# Patient Record
Sex: MALE | Race: WHITE | Employment: OTHER | URBAN - METROPOLITAN AREA
[De-identification: names, ages, dates, MRNs, and addresses within clinical notes are randomized per-mention and may not be internally consistent; named-entity substitution may affect disease eponyms.]

---

## 2018-11-15 PROBLEM — J30.9 ALLERGIC RHINITIS, UNSPECIFIED SEASONALITY, UNSPECIFIED TRIGGER: Status: ACTIVE | Noted: 2018-11-15

## 2018-11-15 PROBLEM — E55.9 VITAMIN D DEFICIENCY: Status: ACTIVE | Noted: 2018-11-15

## 2018-11-15 PROBLEM — R35.0 URINARY FREQUENCY: Status: ACTIVE | Noted: 2018-11-15

## 2018-11-15 PROBLEM — M72.2 PLANTAR FASCIITIS, BILATERAL: Status: ACTIVE | Noted: 2018-11-15

## 2018-11-15 PROBLEM — F41.1 GAD (GENERALIZED ANXIETY DISORDER): Status: ACTIVE | Noted: 2018-11-15

## 2018-11-15 PROBLEM — Z86.19 HISTORY OF SHINGLES: Status: ACTIVE | Noted: 2018-11-15

## 2018-11-15 PROBLEM — M85.80 OSTEOPENIA, UNSPECIFIED LOCATION: Status: ACTIVE | Noted: 2018-11-15

## 2019-01-07 PROCEDURE — 81015 MICROSCOPIC EXAM OF URINE: CPT | Performed by: UROLOGY

## 2019-01-21 RX ORDER — MAGNESIUM HYDROXIDE/ALUMINUM HYDROXICE/SIMETHICONE 120; 1200; 1200 MG/30ML; MG/30ML; MG/30ML
SUSPENSION ORAL 4 TIMES DAILY PRN
COMMUNITY
End: 2019-02-20

## 2019-01-24 ENCOUNTER — ANESTHESIA EVENT (OUTPATIENT)
Dept: ENDOSCOPY | Facility: HOSPITAL | Age: 59
End: 2019-01-24
Payer: COMMERCIAL

## 2019-01-24 ENCOUNTER — ANESTHESIA (OUTPATIENT)
Dept: ENDOSCOPY | Facility: HOSPITAL | Age: 59
End: 2019-01-24
Payer: COMMERCIAL

## 2019-01-24 ENCOUNTER — HOSPITAL ENCOUNTER (OUTPATIENT)
Facility: HOSPITAL | Age: 59
Setting detail: HOSPITAL OUTPATIENT SURGERY
Discharge: HOME | End: 2019-01-24
Attending: INTERNAL MEDICINE | Admitting: INTERNAL MEDICINE
Payer: COMMERCIAL

## 2019-01-24 DIAGNOSIS — K44.9 HIATAL HERNIA: Primary | ICD-10-CM

## 2019-01-24 DIAGNOSIS — R12 HEARTBURN: ICD-10-CM

## 2019-01-24 PROCEDURE — 0DB18ZX EXCISION OF UPPER ESOPHAGUS, VIA NATURAL OR ARTIFICIAL OPENING ENDOSCOPIC, DIAGNOSTIC: ICD-10-PCS | Performed by: INTERNAL MEDICINE

## 2019-01-24 PROCEDURE — 88305 TISSUE EXAM BY PATHOLOGIST: CPT | Performed by: INTERNAL MEDICINE

## 2019-01-24 PROCEDURE — 0DJ08ZZ INSPECTION OF UPPER INTESTINAL TRACT, VIA NATURAL OR ARTIFICIAL OPENING ENDOSCOPIC: ICD-10-PCS | Performed by: INTERNAL MEDICINE

## 2019-01-24 PROCEDURE — 88312 SPECIAL STAINS GROUP 1: CPT | Performed by: INTERNAL MEDICINE

## 2019-01-24 PROCEDURE — 0DB48ZX EXCISION OF ESOPHAGOGASTRIC JUNCTION, VIA NATURAL OR ARTIFICIAL OPENING ENDOSCOPIC, DIAGNOSTIC: ICD-10-PCS | Performed by: INTERNAL MEDICINE

## 2019-01-24 RX ORDER — NALOXONE HYDROCHLORIDE 0.4 MG/ML
80 INJECTION, SOLUTION INTRAMUSCULAR; INTRAVENOUS; SUBCUTANEOUS AS NEEDED
Status: CANCELLED | OUTPATIENT
Start: 2019-01-24 | End: 2019-01-24

## 2019-01-24 RX ORDER — LIDOCAINE HYDROCHLORIDE 10 MG/ML
INJECTION, SOLUTION EPIDURAL; INFILTRATION; INTRACAUDAL; PERINEURAL AS NEEDED
Status: DISCONTINUED | OUTPATIENT
Start: 2019-01-24 | End: 2019-01-24 | Stop reason: SURG

## 2019-01-24 RX ORDER — SODIUM CHLORIDE, SODIUM LACTATE, POTASSIUM CHLORIDE, CALCIUM CHLORIDE 600; 310; 30; 20 MG/100ML; MG/100ML; MG/100ML; MG/100ML
INJECTION, SOLUTION INTRAVENOUS CONTINUOUS PRN
Status: DISCONTINUED | OUTPATIENT
Start: 2019-01-24 | End: 2019-01-24 | Stop reason: SURG

## 2019-01-24 RX ORDER — SODIUM CHLORIDE, SODIUM LACTATE, POTASSIUM CHLORIDE, CALCIUM CHLORIDE 600; 310; 30; 20 MG/100ML; MG/100ML; MG/100ML; MG/100ML
INJECTION, SOLUTION INTRAVENOUS CONTINUOUS
Status: CANCELLED | OUTPATIENT
Start: 2019-01-24

## 2019-01-24 RX ADMIN — SODIUM CHLORIDE, SODIUM LACTATE, POTASSIUM CHLORIDE, CALCIUM CHLORIDE: 600; 310; 30; 20 INJECTION, SOLUTION INTRAVENOUS at 10:15:00

## 2019-01-24 RX ADMIN — SODIUM CHLORIDE, SODIUM LACTATE, POTASSIUM CHLORIDE, CALCIUM CHLORIDE: 600; 310; 30; 20 INJECTION, SOLUTION INTRAVENOUS at 10:32:00

## 2019-01-24 RX ADMIN — LIDOCAINE HYDROCHLORIDE 25 MG: 10 INJECTION, SOLUTION EPIDURAL; INFILTRATION; INTRACAUDAL; PERINEURAL at 10:15:00

## 2019-01-24 NOTE — OPERATIVE REPORT
OPERATIVE REPORT   PATIENT NAME: Magali Guardado  MRN: [id number]  DATE OF OPERATION: 1/24/2019  PREOPERATIVE DIAGNOSIS:   1. Chronic reflux nonresponsive to PPI.   POSTOPERATIVE DIAGNOSES:  1. Small sliding hiatal hernia, Hill grade 2, approximately 1 cm stomach throughout with no evidence of ulcers, masses or other abnormalities. Retroflexion revealed a normal cardia and fundus. Of note small benign-appearing fundic gland polyps were seen. The antrum was normal and the pylorus was patent.   3. Normal duo

## 2019-01-24 NOTE — ANESTHESIA POSTPROCEDURE EVALUATION
Patient: Hansel Samayoa    Procedure Summary     Date:  01/24/19 Room / Location:  [address] [place] [address] ENDOSCOPY 01 / [address] [place] [address] ENDOSCOPY    Anesthesia Start:  7064 Anesthesia Stop:      Procedure:  BRAVO ESOPHAGOGASTRODUODENOSCOPY (N/A ) Diagnosis:       Heartburn      (Hia

## 2019-01-24 NOTE — H&P
[facility] [address] [place] [address] Department of  Gastroenterology  Update Health History :       Payton Ou  male   Chalo Aviles MD     [id number]  7/16/1960 Primary Care Physician  Jhonny Rodriguez MD        HPI :  patient with c/o chronic heartburn symptoms Smokeless tobacco: Never Used      Tobacco comment: cigar    Alcohol use: Yes      Frequency: Never      Comment: occ    Drug use: No       ALLERGIES:     Penicillins             RASH    Current MEDS:    No current facility-administered medications on file medications, H2 blockers and Carafate. Previous unrevealing upper endoscopy procedures except for small hiatal hernia. It is unlikely his symptoms are related to underlying  acid reflux while taking high dose PPI medications.  Symptoms likely component of f

## 2019-01-24 NOTE — ANESTHESIA PREPROCEDURE EVALUATION
Anesthesia PreOp Note    HPI:     Jin Condon is a 63year old male who presents for preoperative consultation requested by: Arnoldo Martinez MD    Date of Surgery: 1/24/2019    Procedure(s):  BRAVO ESOPHAGOGASTRODUODENOSCOPY  Indication: Heartburn DAILY Disp: 225 g Rfl: 1 1/10/2019 at Unknown time   simvastatin 5 MG Oral Tab Take 1 tablet (5 mg total) by mouth daily.  Disp: 30 tablet Rfl: 5    Testosterone cypionate 200 MG/ML Intramuscular Solution INJECT 0.5ML WEEKLY Disp:  Rfl: 2 1/10/2019 at [facility] Tobacco comment: cigar    Substance and Sexual Activity      Alcohol use: Yes        Frequency: Never        Comment: occ      Drug use: No      Sexual activity: No        Partners: Female    Other Topics      Concerns:        Not on file    Social History were answered to the best of my ability. The patient desires the anesthetic management as planned.   Yeison Pandya  1/24/2019 8:54 AM

## 2019-01-25 VITALS
HEIGHT: 66 IN | WEIGHT: 165 LBS | SYSTOLIC BLOOD PRESSURE: 99 MMHG | OXYGEN SATURATION: 99 % | BODY MASS INDEX: 26.52 KG/M2 | HEART RATE: 64 BPM | RESPIRATION RATE: 14 BRPM | DIASTOLIC BLOOD PRESSURE: 61 MMHG

## 2019-01-25 PROBLEM — K21.00 REFLUX ESOPHAGITIS: Status: ACTIVE | Noted: 2019-01-25

## 2019-01-29 NOTE — PROGRESS NOTES
Awaiting BRAVO results. Need to obtain from [address] [place] [address] Avenue: 1/24/2019  PREOPERATIVE DIAGNOSIS:   1. Chronic reflux nonresponsive to PPI.   POSTOPERATIVE DIAGNOSES:  1. Small sliding hiatal hernia, Hill grade 2, approximately 1 cm in length

## 2019-02-20 PROBLEM — M21.611 BUNION OF RIGHT FOOT: Status: ACTIVE | Noted: 2019-02-20

## 2019-02-20 PROBLEM — R74.01 ALT (SGPT) LEVEL RAISED: Status: ACTIVE | Noted: 2019-02-20

## 2019-06-10 PROCEDURE — 36415 COLL VENOUS BLD VENIPUNCTURE: CPT | Performed by: UROLOGY

## 2019-06-10 PROCEDURE — 84402 ASSAY OF FREE TESTOSTERONE: CPT | Performed by: UROLOGY

## 2019-06-10 PROCEDURE — 84403 ASSAY OF TOTAL TESTOSTERONE: CPT | Performed by: UROLOGY

## 2019-08-14 PROBLEM — E78.2 MIXED HYPERLIPIDEMIA: Status: ACTIVE | Noted: 2019-08-14

## 2019-12-04 PROBLEM — I25.10 CAD IN NATIVE ARTERY: Status: ACTIVE | Noted: 2019-12-04

## 2019-12-04 PROBLEM — R93.1 AGATSTON CORONARY ARTERY CALCIUM SCORE BETWEEN 100 AND 199: Status: ACTIVE | Noted: 2019-12-04

## 2020-06-08 PROBLEM — F41.9 ANXIETY: Status: ACTIVE | Noted: 2020-06-08

## 2020-07-27 PROBLEM — U07.1 COVID-19: Status: ACTIVE | Noted: 2020-07-27

## 2021-02-11 PROCEDURE — 88305 TISSUE EXAM BY PATHOLOGIST: CPT | Performed by: INTERNAL MEDICINE

## 2021-02-12 PROBLEM — D12.6 TUBULAR ADENOMA OF COLON: Status: ACTIVE | Noted: 2021-02-12

## 2021-07-27 PROBLEM — U07.1 COVID-19: Status: RESOLVED | Noted: 2020-07-27 | Resolved: 2021-07-27

## 2021-09-22 PROBLEM — M26.609 TMJ (TEMPOROMANDIBULAR JOINT SYNDROME): Status: ACTIVE | Noted: 2021-09-22

## (undated) DEVICE — CAPSULE BRAVO PH

## (undated) DEVICE — FORCEP RADIAL JAW 4

## (undated) DEVICE — Device: Brand: CUSTOM PROCEDURE KIT

## (undated) DEVICE — Device: Brand: DEFENDO AIR/WATER/SUCTION AND BIOPSY VALVE